# Patient Record
Sex: FEMALE | Race: WHITE
[De-identification: names, ages, dates, MRNs, and addresses within clinical notes are randomized per-mention and may not be internally consistent; named-entity substitution may affect disease eponyms.]

---

## 2017-03-11 ENCOUNTER — HOSPITAL ENCOUNTER (EMERGENCY)
Dept: HOSPITAL 10 - FTE | Age: 27
Discharge: HOME | End: 2017-03-11
Payer: COMMERCIAL

## 2017-03-11 VITALS — WEIGHT: 124.56 LBS | HEIGHT: 55 IN | BODY MASS INDEX: 28.83 KG/M2

## 2017-03-11 DIAGNOSIS — R30.0: Primary | ICD-10-CM

## 2017-03-11 DIAGNOSIS — F17.210: ICD-10-CM

## 2017-03-11 PROCEDURE — 96372 THER/PROPH/DIAG INJ SC/IM: CPT

## 2017-03-11 PROCEDURE — 81003 URINALYSIS AUTO W/O SCOPE: CPT

## 2017-03-11 PROCEDURE — 87086 URINE CULTURE/COLONY COUNT: CPT

## 2017-03-11 PROCEDURE — 87591 N.GONORRHOEAE DNA AMP PROB: CPT

## 2017-03-11 NOTE — ERD
ER Documentation


Chief Complaint


Date/Time


DATE: 3/11/17 


TIME: 16:17


Chief Complaint


VAGINAL DISCHARGE FOR THE PAST FEW DAYS. NO FEVERS. MILD PAIN





HPI


This is a 26-year-old female presenting to the emergency department for dysuria 

and white vaginal discharge x 3 days.  Patient states for the past 3 days she 

has had itching to vagina with large amount of white cottage cheese like 

discharge.  Patient also reports foul odor.  Patient is concerned because she 

has a new sexual partner and had unprotected sexual intercourse.  No pelvic 

pain or vaginal bleeding.  Patient was recently on MetroGel for treatment of 

BV.  No rashes or lesions.  No fevers or chills.  No abdominal or back pain.





ROS


All systems reviewed and are negative except as per history of present illness.





Medications


Home Meds


Active Scripts


Fluconazole* (Diflucan*) 150 Mg Tablet, 150 MG PO ONCE, #1 TAB


   Prov:RADHA BAR NP         3/11/17


Metronidazole* (Flagyl*) 500 Mg Tablet, 500 MG PO TID for 5 Days, TAB


   Prov:MARIBEL JASSO S.         1/7/15


Ciprofloxacin Hcl* (Ciprofloxacin Hcl*) 500 Mg Tablet, 500 MG PO BID for 5 Days

, TAB


   Prov:JEFFERY JASSOP S.         1/7/15


Ondansetron Hcl* (Zofran*) 4 Mg Tab, 4 MG PO Q6H Y for NAUSEA AND OR VOMITING 

for 30 Days, TAB


   Prov:JEFFERY JASSOP S.         1/7/15


Hyoscyamine Sulfate* (Levsin*) 0.125 Mg Tab, 0.125 MG PO AC MEALS for 30 Days, 

1 Refill


   Prov:MARIBEL JASSO S.         1/7/15





Allergies


Allergies:  


Coded Allergies:  


     No Known Drug Allergies (Verified  Allergy, Mild, 1/5/15)





PMhx/Soc


History of Surgery:  No


Anesthesia Reaction:  No


Hx Neurological Disorder:  No


Hx Respiratory Disorders:  Yes (bronchitis)


Hx Cardiac Disorders:  No


Hx Psychiatric Problems:  Yes (anxiety)


Hx Miscellaneous Medical Probl:  No


Hx Alcohol Use:  Yes (2x a week)


Hx Substance Use:  No


Hx Tobacco Use:  Yes (1 pack a day)


Smoking Status:  Current every day smoker





Physical Exam


Vitals





Vital Signs








  Date Time  Temp Pulse Resp B/P Pulse Ox O2 Delivery O2 Flow Rate FiO2


 


3/11/17 12:20 98.1 78 20 125/77 98   








Physical Exam


Const:               No acute distress. 


Head:   Atraumatic 


Eyes:    Normal Conjunctiva


ENT:    Normal External Ears, Nose and Mouth.


Neck:               Full range of motion..~ No meningismus.


Resp:    Clear to auscultation bilaterally


Cardio:    Regular rate and rhythm, no murmurs


Abd:    Soft, non tender, non distended. Normal bowel sounds


Skin:    No petechiae or rashes


Back:    No midline or flank tenderness


Ext:    No cyanosis, or edema


Neur:    Awake and alert


Psych:    Normal Mood and Affect


:                  Small amount of white, curd-like discharge from vaginal 

vault. no rashes or lesions.


Results 24 hrs





 Laboratory Tests








Test


  3/11/17


12:37


 


Bedside Urine Blood Negative 


 


Bedside Urine Glucose (UA) Negative 


 


Bedside Urine Ketones (LAB) Trace 


 


Bedside Urine Leukocyte


Esterase (L Trace 


 


 


Bedside Urine Nitrite (LAB) Negative 


 


Bedside Urine Protein (LAB) Negative 


 


Bedside Urine pH (LAB) 5.0 








 Current Medications








 Medications


  (Trade)  Dose


 Ordered  Sig/Raji


 Route


 PRN Reason  Start Time


 Stop Time Status Last Admin


Dose Admin


 


 Ceftriaxone Sodium


  (Rocephin)  250 mg  ONCE  ONCE


 IM


   3/11/17 14:00


 3/11/17 14:01 DC 3/11/17 14:16


 


 


 Azithromycin


  (Zithromax)  1,000 mg  ONCE  ONCE


 PO


   3/11/17 14:00


 3/11/17 14:01 DC 3/11/17 14:16


 











Procedures/MDM


ED COURSE:


The patient was stable throughout ED course. I kept the patient and/or family 

informed of laboratory and diagnostic imaging results throughout the ED course.

  





Laboratory


Urine dip0 trace leukocytes


gonorrhea/chlamydia- pending





MDM: This is a 26-year-old female presenting to the emergency department for 

dysuria, pruritus and white vaginal discharge x 3 days.  Patient has recent new 

sexual partner and is concerned about STDs.  Urine shows trace leukocyte 

esterase and trace ketones.  gonorrhea and chlamydia urine test awaiting 

results.  Patient counselled on preventing STDs and prophylactic treatment of 

gonorrhea/chlamydia.  Patient agrees to plan of care and received Rocephin 

250mg IM and azithromycin 1g PO while in the ED as prophylactic care of 

gonorrhea and chlamydia.  Patient tolerated medications well.  Vital signs are 

stable.  No fevers or chills.  No vaginal bleeding or pelvic cramping.  No 

abdominal pain or back pain.  No vomiting or diarrhea.  





Differential diagnosis includes but not limited to UTI, gonorrhea, chlamydia, 

bacterial vaginosis, vaginitis, candidal infection or pelvic inflammatory 

disease.  





Patient is appropriate for outpatient management will be given prescription for 

Diflucan.  Instructed patient to follow-up with primary care provider in the 

next 24-48 hours for reassessment and additional management.  Instructed 

patient that we will be receiving results of gonorrhea and chlamydia inthe next 

few days.  Return to ED for any high fever, chest pain, difficulty breathing, 

shortness breath, wheezing, vomiting, diarrhea, abdominal pain or any new or 

worsening symptoms. Patient verbalizes understanding. All questions answered at 

discharge.





Departure


Diagnosis:  


 Primary Impression:  


 Dysuria


Condition:  Stable


Patient Instructions:  Dysuria


Referrals:  


[facility]


YOU HAVE RECEIVED A MEDICAL SCREENING EXAM AND THE RESULTS INDICATE THAT YOU DO 

NOT HAVE A CONDITION THAT REQUIRES URGENT TREATMENT IN THE EMERGENCY DEPARTMENT.





FURTHER EVALUATION AND TREATMENT OF YOUR CONDITION CAN WAIT UNTIL YOU ARE SEEN 

IN YOUR DOCTORS OFFICE WITHIN THE NEXT 1-2 DAYS. IT IS YOUR RESPONSIBILITY TO 

MAKE AN APPOINTMENT FOR FOLOW-UP CARE.





IF YOU HAVE A PRIMARY DOCTOR


--you should call your primary doctor and schedule an appointment





IF YOU DO NOT HAVE A PRIMARY DOCTOR YOU CAN CALL OUR PHYSICIAN REFERRAL HOTLINE 

AT


 [phone] 





IF YOU CAN NOT AFFORD TO SEE A PHYSICIAN YOU CAN CHOSE FROM THE FOLLOWING 

[facility] [phone] [address] [facility]. [facility] [phone] [address] [facility]. [facility] [phone] [address] [facility]. [facility] [phone] [address] [facility]. [facility] [phone] [address] [facility]. [facility]. [phone] [address] [facility]. [facility]


YOU HAVE RECEIVED A MEDICAL SCREENING EXAM AND THE RESULTS INDICATE THAT YOU DO 

NOT HAVE A CONDITION THAT REQUIRES URGENT TREATMENT IN THE EMERGENCY DEPARTMENT.





FURTHER EVALUATION AND TREATMENT OF YOUR CONDITION CAN WAIT UNTIL YOU ARE SEEN 

IN YOUR DOCTORS OFFICE WITHIN THE NEXT 1-2 DAYS. IT IS YOUR RESPONSIBILITY TO 

MAKE AN APPOINTMENT FOR FOLOW-UP CARE.





IF YOU HAVE A PRIMARY DOCTOR


--you should call your primary doctor and schedule and appointment





IF YOU DO NOT HAVE A PRIMARY DOCTOR YOU CAN CALL OUR PHYSICIAN REFERRAL HOTLINE 

AT [phone].





IF YOU CAN NOT AFFORD TO SEE A PHYSICIAN YOU CAN CHOSE FROM THE FOLLOWING 

[facility] INSTITUTIONS:





[facility]


[address] [place] [address]





[facility]


[address] [place] [address]





[facility]


[address] [place] [address]





Additional Instructions:  


Call your primary care doctor TOMORROW for an appointment during the next 2-3 

days.See the doctor sooner or return here if your condition worsens before your 

appointment time.





Return to ED for any high fever, chest pain, difficulty breathing, shortness 

breath, wheezing, vomiting, diarrhea, abdominal pain or any new or worsening 

symptoms.











RADHA BAR NP Mar 11, 2017 16:31

## 2017-03-14 LAB — LABORATORY COMMENT REPORT: (no result)

## 2017-03-21 ENCOUNTER — HOSPITAL ENCOUNTER (EMERGENCY)
Dept: HOSPITAL 10 - FTE | Age: 27
Discharge: LEFT BEFORE DISCHARGE | End: 2017-03-21
Payer: SELF-PAY

## 2017-03-21 VITALS
BODY MASS INDEX: 18.82 KG/M2 | HEIGHT: 64 IN | WEIGHT: 110.23 LBS | HEIGHT: 64 IN | BODY MASS INDEX: 18.82 KG/M2 | WEIGHT: 110.23 LBS

## 2017-03-21 DIAGNOSIS — Z53.21: Primary | ICD-10-CM

## 2017-10-16 ENCOUNTER — HOSPITAL ENCOUNTER (EMERGENCY)
Dept: HOSPITAL 10 - FTE | Age: 27
LOS: 1 days | Discharge: LEFT BEFORE DISCHARGE | End: 2017-10-17
Payer: SELF-PAY

## 2017-10-16 VITALS
HEIGHT: 63 IN | WEIGHT: 126.77 LBS | BODY MASS INDEX: 22.46 KG/M2 | WEIGHT: 126.77 LBS | BODY MASS INDEX: 22.46 KG/M2 | HEIGHT: 63 IN

## 2017-10-16 DIAGNOSIS — Z53.21: Primary | ICD-10-CM

## 2018-01-28 ENCOUNTER — HOSPITAL ENCOUNTER (EMERGENCY)
Dept: HOSPITAL 54 - ER | Age: 28
LOS: 1 days | Discharge: LEFT BEFORE DISCHARGE | End: 2018-01-29
Payer: MEDICAID

## 2018-01-28 VITALS — HEIGHT: 64 IN | BODY MASS INDEX: 20.49 KG/M2 | WEIGHT: 120 LBS

## 2018-01-28 DIAGNOSIS — Z71.6: ICD-10-CM

## 2018-01-28 DIAGNOSIS — F41.9: ICD-10-CM

## 2018-01-28 DIAGNOSIS — R05: Primary | ICD-10-CM

## 2018-01-28 DIAGNOSIS — N89.8: ICD-10-CM

## 2018-01-28 DIAGNOSIS — H92.02: ICD-10-CM

## 2018-01-28 PROCEDURE — A4606 OXYGEN PROBE USED W OXIMETER: HCPCS

## 2018-01-28 PROCEDURE — Z7610: HCPCS

## 2018-01-28 NOTE — NUR
BIBSELF C/O "COUGH X2 WEEKS; EAR PAIN" RR EVEN AND UNLABORED. NO SOB NOTED. NAD 
NOTED. NO NVD AT THIS TIME. PT GOWNED AND PLACED ON MONITOR WAITING FOR MD CHONG.

## 2018-01-29 VITALS — SYSTOLIC BLOOD PRESSURE: 116 MMHG | DIASTOLIC BLOOD PRESSURE: 64 MMHG

## 2018-01-29 RX ADMIN — AZITHROMYCIN DIHYDRATE ONE MG: 250 TABLET, FILM COATED ORAL at 00:14

## 2018-01-29 RX ADMIN — PSEUDOEPHEDRINE HCL ONE MG: 30 TABLET, FILM COATED ORAL at 00:13

## 2018-01-29 NOTE — NUR
Patient does not wish to proceed with medical care recommended by  
Patient given information related to possible complications, up to and 
including death, which could occur as a result of leaving the hospital at this 
time. Patient verbalizes understanding of risks involved due to leaving against 
medical advice. Patient has signed AMA form.

## 2019-02-23 ENCOUNTER — HOSPITAL ENCOUNTER (EMERGENCY)
Dept: HOSPITAL 54 - ER | Age: 29
Discharge: HOME | End: 2019-02-23
Payer: MEDICAID

## 2019-02-23 VITALS — BODY MASS INDEX: 20.49 KG/M2 | WEIGHT: 120 LBS | HEIGHT: 64 IN

## 2019-02-23 VITALS — SYSTOLIC BLOOD PRESSURE: 125 MMHG | DIASTOLIC BLOOD PRESSURE: 92 MMHG

## 2019-02-23 DIAGNOSIS — F17.200: ICD-10-CM

## 2019-02-23 DIAGNOSIS — F41.9: Primary | ICD-10-CM

## 2019-02-23 DIAGNOSIS — R00.0: ICD-10-CM

## 2019-02-23 PROCEDURE — 93005 ELECTROCARDIOGRAM TRACING: CPT

## 2019-02-23 PROCEDURE — 99284 EMERGENCY DEPT VISIT MOD MDM: CPT

## 2019-02-23 NOTE — NUR
IV removed. Catheter intact and site benign. Pressure and 4x4 applied to site. 
No bleeding noted.Patient discharged to home in stable condition. Written and 
verbal after care instructions given. Patient verbalizes understanding of 
instruction. PT AMBULATORY WITH STEADY GAIT ACCOMPANIED BY MOTHER.

## 2019-02-23 NOTE — NUR
PT MONICA FROM HOME C/O ANXIETY X 1 HOUR. HX OF ANXIETY. PT MOTHER AT CHAIRSIDE. 
WILL CONTINUE TO MONITOR.

## 2019-11-25 ENCOUNTER — HOSPITAL ENCOUNTER (EMERGENCY)
Dept: HOSPITAL 54 - ER | Age: 29
Discharge: HOME | End: 2019-11-25
Payer: MEDICAID

## 2019-11-25 VITALS — BODY MASS INDEX: 22.2 KG/M2 | HEIGHT: 64 IN | WEIGHT: 130 LBS

## 2019-11-25 VITALS — SYSTOLIC BLOOD PRESSURE: 111 MMHG | DIASTOLIC BLOOD PRESSURE: 78 MMHG

## 2019-11-25 DIAGNOSIS — N61.1: Primary | ICD-10-CM

## 2019-11-25 DIAGNOSIS — F41.9: ICD-10-CM

## 2019-11-25 DIAGNOSIS — F17.200: ICD-10-CM

## 2019-11-25 DIAGNOSIS — F10.10: ICD-10-CM
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
## 2019-11-25 RX ADMIN — IBUPROFEN ONE MG: 600 TABLET, FILM COATED ORAL at 02:20

## 2019-11-25 RX ADMIN — CEPHALEXIN ONE MG: 500 CAPSULE ORAL at 02:20

## 2020-02-06 ENCOUNTER — HOSPITAL ENCOUNTER (EMERGENCY)
Dept: HOSPITAL 54 - ER | Age: 30
Discharge: HOME | End: 2020-02-06
Payer: MEDICAID

## 2020-02-06 VITALS — SYSTOLIC BLOOD PRESSURE: 128 MMHG | DIASTOLIC BLOOD PRESSURE: 78 MMHG

## 2020-02-06 VITALS — WEIGHT: 132 LBS | BODY MASS INDEX: 22.53 KG/M2 | HEIGHT: 64 IN
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
## 2020-02-06 DIAGNOSIS — F17.200: ICD-10-CM

## 2020-02-06 DIAGNOSIS — R00.0: ICD-10-CM

## 2020-02-06 DIAGNOSIS — F10.10: ICD-10-CM

## 2020-02-06 DIAGNOSIS — R00.2: ICD-10-CM

## 2020-02-06 DIAGNOSIS — F41.9: Primary | ICD-10-CM

## 2020-02-06 DIAGNOSIS — F12.10: ICD-10-CM

## 2020-02-06 NOTE — NUR
Patient discharged to home in stable condition. Written and verbal after care 
instructions given. Patient verbalizes understanding of instruction. Pt 
ambulated with steady gait. vss.

## 2020-02-06 NOTE — NUR
PT AAOX4. AMBULATORY WITH STEADY GAIT. BIBRA C/O ANXIETY S/P SMOKING MARIJUANA. 
PT STATED SHE SMOKERD MARIJUANA YESTERDAY. PLACED ON MONITOR AND PULSE OX. VSS. 
NO ACUTE DISTRESS NOTED. PT NTOED SHE TOOK Kyrgyz HERBAL WHICH HELPED HER 
CALM DOWN.

## 2020-06-24 ENCOUNTER — HOSPITAL ENCOUNTER (EMERGENCY)
Dept: HOSPITAL 54 - ER | Age: 30
Discharge: HOME | End: 2020-06-24
Payer: MEDICAID

## 2020-06-24 VITALS — BODY MASS INDEX: 24.75 KG/M2 | HEIGHT: 64 IN | WEIGHT: 145 LBS

## 2020-06-24 VITALS — DIASTOLIC BLOOD PRESSURE: 61 MMHG | SYSTOLIC BLOOD PRESSURE: 101 MMHG

## 2020-06-24 DIAGNOSIS — F14.90: Primary | ICD-10-CM

## 2020-06-24 DIAGNOSIS — F17.200: ICD-10-CM

## 2020-06-24 DIAGNOSIS — F41.9: ICD-10-CM

## 2020-06-24 LAB
BASOPHILS # BLD AUTO: 0.1 /CMM (ref 0–0.2)
BASOPHILS NFR BLD AUTO: 0.5 % (ref 0–2)
BUN SERPL-MCNC: 11 MG/DL (ref 7–18)
CALCIUM SERPL-MCNC: 8.9 MG/DL (ref 8.5–10.1)
CHLORIDE SERPL-SCNC: 99 MMOL/L (ref 98–107)
CO2 SERPL-SCNC: 26 MMOL/L (ref 21–32)
CREAT SERPL-MCNC: 0.7 MG/DL (ref 0.6–1.3)
EOSINOPHIL NFR BLD AUTO: 0.3 % (ref 0–6)
GLUCOSE SERPL-MCNC: 99 MG/DL (ref 74–106)
HCT VFR BLD AUTO: 41 % (ref 33–45)
HGB BLD-MCNC: 13.9 G/DL (ref 11.5–14.8)
LYMPHOCYTES NFR BLD AUTO: 2.4 /CMM (ref 0.8–4.8)
LYMPHOCYTES NFR BLD AUTO: 20 % (ref 20–44)
MCHC RBC AUTO-ENTMCNC: 34 G/DL (ref 31–36)
MCV RBC AUTO: 90 FL (ref 82–100)
MONOCYTES NFR BLD AUTO: 0.8 /CMM (ref 0.1–1.3)
MONOCYTES NFR BLD AUTO: 6.5 % (ref 2–12)
NEUTROPHILS # BLD AUTO: 8.7 /CMM (ref 1.8–8.9)
NEUTROPHILS NFR BLD AUTO: 72.7 % (ref 43–81)
PLATELET # BLD AUTO: 297 /CMM (ref 150–450)
POTASSIUM SERPL-SCNC: 3.2 MMOL/L (ref 3.5–5.1)
RBC # BLD AUTO: 4.59 MIL/UL (ref 4–5.2)
SODIUM SERPL-SCNC: 135 MMOL/L (ref 136–145)
WBC NRBC COR # BLD AUTO: 12 K/UL (ref 4.3–11)

## 2020-06-24 PROCEDURE — 85025 COMPLETE CBC W/AUTO DIFF WBC: CPT

## 2020-06-24 PROCEDURE — 84703 CHORIONIC GONADOTROPIN ASSAY: CPT

## 2020-06-24 PROCEDURE — 99285 EMERGENCY DEPT VISIT HI MDM: CPT

## 2020-06-24 PROCEDURE — 71045 X-RAY EXAM CHEST 1 VIEW: CPT

## 2020-06-24 PROCEDURE — 96374 THER/PROPH/DIAG INJ IV PUSH: CPT

## 2020-06-24 PROCEDURE — 84484 ASSAY OF TROPONIN QUANT: CPT

## 2020-06-24 PROCEDURE — 80048 BASIC METABOLIC PNL TOTAL CA: CPT

## 2020-06-24 PROCEDURE — 93005 ELECTROCARDIOGRAM TRACING: CPT

## 2020-06-24 PROCEDURE — 36415 COLL VENOUS BLD VENIPUNCTURE: CPT

## 2020-06-24 NOTE — NUR
[id number] FRM HOME C/O HEART PALPITATION S/P COCAINE USE. +ETOH SMELL. ON ROOM 
AIR, BREATHING EVENLY AND UNLABORED. CONNECTED TO THE MONITOR AND PULSE OX. 
KEPT COMFORTABLE, WILL CONTINUE TO MONITOR ACCORDINGLY.

## 2020-08-04 ENCOUNTER — HOSPITAL ENCOUNTER (EMERGENCY)
Dept: HOSPITAL 54 - ER | Age: 30
Discharge: HOME | End: 2020-08-04
Payer: MEDICAID

## 2020-08-04 VITALS — SYSTOLIC BLOOD PRESSURE: 115 MMHG | DIASTOLIC BLOOD PRESSURE: 65 MMHG

## 2020-08-04 VITALS — BODY MASS INDEX: 27.88 KG/M2 | WEIGHT: 142 LBS | HEIGHT: 60 IN

## 2020-08-04 DIAGNOSIS — F14.10: Primary | ICD-10-CM

## 2020-08-04 DIAGNOSIS — F41.9: ICD-10-CM
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
## 2020-08-04 DIAGNOSIS — F17.200: ICD-10-CM

## 2020-08-04 DIAGNOSIS — F10.10: ICD-10-CM

## 2020-08-04 NOTE — NUR
pt bibra from home. c/o anxiety, chest palpitation x today. pt admits to using 
cocain last night. pt seen in ED multiple times for same reason. tachycardic 
pta. awaiting md hollingsworth.

## 2020-09-03 ENCOUNTER — HOSPITAL ENCOUNTER (EMERGENCY)
Dept: HOSPITAL 54 - ER | Age: 30
Discharge: HOME | End: 2020-09-03
Payer: MEDICAID

## 2020-09-03 VITALS — SYSTOLIC BLOOD PRESSURE: 111 MMHG | DIASTOLIC BLOOD PRESSURE: 72 MMHG

## 2020-09-03 VITALS — WEIGHT: 140 LBS | HEIGHT: 63 IN | BODY MASS INDEX: 24.8 KG/M2

## 2020-09-03 DIAGNOSIS — R11.0: ICD-10-CM

## 2020-09-03 DIAGNOSIS — I10: ICD-10-CM

## 2020-09-03 DIAGNOSIS — F17.200: ICD-10-CM

## 2020-09-03 DIAGNOSIS — F15.10: ICD-10-CM

## 2020-09-03 DIAGNOSIS — F41.9: ICD-10-CM

## 2020-09-03 DIAGNOSIS — R10.30: Primary | ICD-10-CM

## 2020-09-03 LAB
ALBUMIN SERPL BCP-MCNC: 4 G/DL (ref 3.4–5)
ALP SERPL-CCNC: 66 U/L (ref 46–116)
ALT SERPL W P-5'-P-CCNC: 23 U/L (ref 12–78)
APPEARANCE UR: CLEAR
AST SERPL W P-5'-P-CCNC: 24 U/L (ref 15–37)
BASOPHILS # BLD AUTO: 0.1 /CMM (ref 0–0.2)
BASOPHILS NFR BLD AUTO: 0.6 % (ref 0–2)
BILIRUB DIRECT SERPL-MCNC: 0.2 MG/DL (ref 0–0.2)
BILIRUB SERPL-MCNC: 0.7 MG/DL (ref 0.2–1)
BILIRUB UR QL STRIP: NEGATIVE
BUN SERPL-MCNC: 18 MG/DL (ref 7–18)
CALCIUM SERPL-MCNC: 9.1 MG/DL (ref 8.5–10.1)
CHLORIDE SERPL-SCNC: 101 MMOL/L (ref 98–107)
CO2 SERPL-SCNC: 27 MMOL/L (ref 21–32)
COLOR UR: YELLOW
CREAT SERPL-MCNC: 0.7 MG/DL (ref 0.6–1.3)
EOSINOPHIL NFR BLD AUTO: 0.5 % (ref 0–6)
GLUCOSE SERPL-MCNC: 84 MG/DL (ref 74–106)
GLUCOSE UR STRIP-MCNC: NEGATIVE MG/DL
HCT VFR BLD AUTO: 41 % (ref 33–45)
HGB BLD-MCNC: 13.7 G/DL (ref 11.5–14.8)
HGB UR QL STRIP: (no result) ERY/UL
KETONES UR STRIP-MCNC: NEGATIVE MG/DL
LEUKOCYTE ESTERASE UR QL STRIP: NEGATIVE
LIPASE SERPL-CCNC: 119 U/L (ref 73–393)
LYMPHOCYTES NFR BLD AUTO: 1.6 /CMM (ref 0.8–4.8)
LYMPHOCYTES NFR BLD AUTO: 16.1 % (ref 20–44)
MCHC RBC AUTO-ENTMCNC: 33 G/DL (ref 31–36)
MCV RBC AUTO: 90 FL (ref 82–100)
MONOCYTES NFR BLD AUTO: 0.6 /CMM (ref 0.1–1.3)
MONOCYTES NFR BLD AUTO: 5.7 % (ref 2–12)
NEUTROPHILS # BLD AUTO: 7.7 /CMM (ref 1.8–8.9)
NEUTROPHILS NFR BLD AUTO: 77.1 % (ref 43–81)
NITRITE UR QL STRIP: NEGATIVE
PH UR STRIP: 6 [PH] (ref 5–8)
PLATELET # BLD AUTO: 221 /CMM (ref 150–450)
POTASSIUM SERPL-SCNC: 3.8 MMOL/L (ref 3.5–5.1)
PROT SERPL-MCNC: 7.6 G/DL (ref 6.4–8.2)
PROT UR QL STRIP: NEGATIVE MG/DL
RBC # BLD AUTO: 4.59 MIL/UL (ref 4–5.2)
SODIUM SERPL-SCNC: 138 MMOL/L (ref 136–145)
UROBILINOGEN UR STRIP-MCNC: 0.2 EU/DL
WBC NRBC COR # BLD AUTO: 10 K/UL (ref 4.3–11)

## 2020-09-03 PROCEDURE — 81001 URINALYSIS AUTO W/SCOPE: CPT

## 2020-09-03 PROCEDURE — 80048 BASIC METABOLIC PNL TOTAL CA: CPT

## 2020-09-03 PROCEDURE — 85025 COMPLETE CBC W/AUTO DIFF WBC: CPT

## 2020-09-03 PROCEDURE — 36415 COLL VENOUS BLD VENIPUNCTURE: CPT

## 2020-09-03 PROCEDURE — 80076 HEPATIC FUNCTION PANEL: CPT

## 2020-09-03 PROCEDURE — 99284 EMERGENCY DEPT VISIT MOD MDM: CPT

## 2020-09-03 PROCEDURE — 84703 CHORIONIC GONADOTROPIN ASSAY: CPT

## 2020-09-03 PROCEDURE — 83690 ASSAY OF LIPASE: CPT

## 2020-09-03 PROCEDURE — 76856 US EXAM PELVIC COMPLETE: CPT

## 2020-09-03 NOTE — NUR
PT AAOX4. BIBSELF C/O LOWER ABDOMINAL PAIN X1 DAY 
+NAUSEA,-VOMITTING,-DYSURIA,-VB. NO ACUTE DISTRESS NOTED. PT PLACED ON MONITOR 
AND PULSE OX. VSS. NO ACUTE DISTRESS NOTED.

## 2021-03-28 ENCOUNTER — HOSPITAL ENCOUNTER (EMERGENCY)
Dept: HOSPITAL 54 - ER | Age: 31
Discharge: HOME | End: 2021-03-28
Payer: MEDICAID

## 2021-03-28 VITALS — SYSTOLIC BLOOD PRESSURE: 123 MMHG | DIASTOLIC BLOOD PRESSURE: 66 MMHG

## 2021-03-28 VITALS — BODY MASS INDEX: 22.08 KG/M2 | HEIGHT: 62 IN | WEIGHT: 120 LBS

## 2021-03-28 DIAGNOSIS — F41.9: ICD-10-CM

## 2021-03-28 DIAGNOSIS — N89.8: Primary | ICD-10-CM

## 2021-03-28 PROCEDURE — 84703 CHORIONIC GONADOTROPIN ASSAY: CPT

## 2021-03-28 PROCEDURE — 96372 THER/PROPH/DIAG INJ SC/IM: CPT

## 2021-03-28 PROCEDURE — 99283 EMERGENCY DEPT VISIT LOW MDM: CPT

## 2021-03-28 PROCEDURE — 87491 CHLMYD TRACH DNA AMP PROBE: CPT

## 2021-03-28 PROCEDURE — 87591 N.GONORRHOEAE DNA AMP PROB: CPT

## 2021-03-28 NOTE — NUR
PATIENT CAME TO ER BED 16 C/O CONCERN FOR GENITALIA ISSUES. PATIENT STATES THAT 
SHE HAD NOT HAD SEX FOR 5 MONTHS AND "DECIDED TO HOOK UP WITH SOMEONE". PATIENT 
STATES THAT SHE HAS ITCHINESS IN THE CROTCH REASON. PATIENT IS AAOX4. NO SOB 
.BREATHING EVENLY AND UNLABORED ON ROOM AIR.

## 2021-05-02 ENCOUNTER — HOSPITAL ENCOUNTER (EMERGENCY)
Dept: HOSPITAL 54 - ER | Age: 31
Discharge: HOME | End: 2021-05-02
Payer: MEDICAID

## 2021-05-02 VITALS — WEIGHT: 140 LBS | HEIGHT: 63 IN | BODY MASS INDEX: 24.8 KG/M2

## 2021-05-02 VITALS — SYSTOLIC BLOOD PRESSURE: 100 MMHG | DIASTOLIC BLOOD PRESSURE: 74 MMHG

## 2021-05-02 DIAGNOSIS — F17.200: ICD-10-CM

## 2021-05-02 DIAGNOSIS — F41.9: Primary | ICD-10-CM

## 2021-05-02 DIAGNOSIS — R42: ICD-10-CM

## 2021-05-02 LAB
ALBUMIN SERPL BCP-MCNC: 4 G/DL (ref 3.4–5)
ALP SERPL-CCNC: 80 U/L (ref 46–116)
ALT SERPL W P-5'-P-CCNC: 26 U/L (ref 12–78)
AST SERPL W P-5'-P-CCNC: 24 U/L (ref 15–37)
BASOPHILS # BLD AUTO: 0 /CMM (ref 0–0.2)
BASOPHILS NFR BLD AUTO: 0.4 % (ref 0–2)
BILIRUB DIRECT SERPL-MCNC: 0.1 MG/DL (ref 0–0.2)
BILIRUB SERPL-MCNC: 0.4 MG/DL (ref 0.2–1)
BILIRUB UR QL STRIP: NEGATIVE
BUN SERPL-MCNC: 16 MG/DL (ref 7–18)
CALCIUM SERPL-MCNC: 9.2 MG/DL (ref 8.5–10.1)
CHLORIDE SERPL-SCNC: 103 MMOL/L (ref 98–107)
CO2 SERPL-SCNC: 27 MMOL/L (ref 21–32)
COLOR UR: YELLOW
CREAT SERPL-MCNC: 0.7 MG/DL (ref 0.6–1.3)
EOSINOPHIL NFR BLD AUTO: 1.1 % (ref 0–6)
GLUCOSE SERPL-MCNC: 89 MG/DL (ref 74–106)
GLUCOSE UR STRIP-MCNC: NEGATIVE MG/DL
HCT VFR BLD AUTO: 41 % (ref 33–45)
HGB BLD-MCNC: 13.8 G/DL (ref 11.5–14.8)
LEUKOCYTE ESTERASE UR QL STRIP: (no result)
LIPASE SERPL-CCNC: 499 U/L (ref 73–393)
LYMPHOCYTES NFR BLD AUTO: 2.4 /CMM (ref 0.8–4.8)
LYMPHOCYTES NFR BLD AUTO: 22.7 % (ref 20–44)
MCHC RBC AUTO-ENTMCNC: 34 G/DL (ref 31–36)
MCV RBC AUTO: 89 FL (ref 82–100)
MONOCYTES NFR BLD AUTO: 0.7 /CMM (ref 0.1–1.3)
MONOCYTES NFR BLD AUTO: 6.9 % (ref 2–12)
NEUTROPHILS # BLD AUTO: 7.2 /CMM (ref 1.8–8.9)
NEUTROPHILS NFR BLD AUTO: 68.9 % (ref 43–81)
NITRITE UR QL STRIP: NEGATIVE
PH UR STRIP: 6 [PH] (ref 5–8)
PLATELET # BLD AUTO: 232 /CMM (ref 150–450)
POTASSIUM SERPL-SCNC: 3.7 MMOL/L (ref 3.5–5.1)
PROT SERPL-MCNC: 7.8 G/DL (ref 6.4–8.2)
PROT UR QL STRIP: NEGATIVE MG/DL
RBC # BLD AUTO: 4.6 MIL/UL (ref 4–5.2)
RBC #/AREA URNS HPF: (no result) /HPF (ref 0–2)
SODIUM SERPL-SCNC: 137 MMOL/L (ref 136–145)
UROBILINOGEN UR STRIP-MCNC: 0.2 EU/DL
WBC #/AREA URNS HPF: (no result) /HPF
WBC #/AREA URNS HPF: (no result) /HPF (ref 0–3)
WBC NRBC COR # BLD AUTO: 10.5 K/UL (ref 4.3–11)

## 2021-05-02 PROCEDURE — 80076 HEPATIC FUNCTION PANEL: CPT

## 2021-05-02 PROCEDURE — 85025 COMPLETE CBC W/AUTO DIFF WBC: CPT

## 2021-05-02 PROCEDURE — 99284 EMERGENCY DEPT VISIT MOD MDM: CPT

## 2021-05-02 PROCEDURE — 87086 URINE CULTURE/COLONY COUNT: CPT

## 2021-05-02 PROCEDURE — 96374 THER/PROPH/DIAG INJ IV PUSH: CPT

## 2021-05-02 PROCEDURE — 80048 BASIC METABOLIC PNL TOTAL CA: CPT

## 2021-05-02 PROCEDURE — 96361 HYDRATE IV INFUSION ADD-ON: CPT

## 2021-05-02 PROCEDURE — 93005 ELECTROCARDIOGRAM TRACING: CPT

## 2021-05-02 PROCEDURE — 84703 CHORIONIC GONADOTROPIN ASSAY: CPT

## 2021-05-02 PROCEDURE — 81001 URINALYSIS AUTO W/SCOPE: CPT

## 2021-05-02 PROCEDURE — 83690 ASSAY OF LIPASE: CPT

## 2021-05-02 PROCEDURE — 36415 COLL VENOUS BLD VENIPUNCTURE: CPT

## 2021-05-02 NOTE — NUR
PT BIBRA 881 FROM WORK C/O DIZZINESS X2 DAYS MUCH WORSE TODAY. PT IS AAOX4, NOT 
IN RESPIRATORY DISTRESS, V/S STABLE, KEPT RESTED AND COMFORTABLE. WILL CONTINUE 
TO MONITOR.

## 2021-05-02 NOTE — NUR
Patient:   JENNIFER TROTTER            MRN: [id number]            FIN: [id number]               Age:   55 years     Sex:  FEMALE     :  62   Associated Diagnoses:   None   Author:   BRITTON HERNANDEZ      **Required when the History and Physical has been performed prior to Registration**  (within a 30 day period, if it's over 30 days then a new and complete History and Physicial needs to be completed)  **An update to the history and phsycial must be completed prior to the start of the surgery or procedure requiring anesthesia services.**    Prior to the procedure the History and Physicial from date _17  has been reviewed and I have examined the patient.  Based upon my physicial assessment and interview of the patient:    Check and/or complete:    [X] No significant changes have occurred in the patient's condition since the History and Physical was completed.      OR    [_] Changes to History and Physical (see below):     _                                          Electronically Signed On 2017 10:37  __________________________________________________   BRITTON HERNANDEZ     REPORT GIVEN TO TOSHA RESENDEZ FOR CARMELITA.

## 2021-05-04 ENCOUNTER — HOSPITAL ENCOUNTER (EMERGENCY)
Dept: HOSPITAL 54 - ER | Age: 31
Discharge: HOME | End: 2021-05-04
Payer: MEDICAID

## 2021-05-04 VITALS — BODY MASS INDEX: 24.8 KG/M2 | HEIGHT: 63 IN | WEIGHT: 140 LBS

## 2021-05-04 VITALS — SYSTOLIC BLOOD PRESSURE: 110 MMHG | DIASTOLIC BLOOD PRESSURE: 85 MMHG

## 2021-05-04 DIAGNOSIS — R42: ICD-10-CM

## 2021-05-04 DIAGNOSIS — R51.9: Primary | ICD-10-CM

## 2021-05-04 DIAGNOSIS — F17.200: ICD-10-CM

## 2021-05-04 DIAGNOSIS — Z79.899: ICD-10-CM

## 2021-05-04 DIAGNOSIS — F41.9: ICD-10-CM

## 2021-05-04 PROCEDURE — 99283 EMERGENCY DEPT VISIT LOW MDM: CPT

## 2021-05-04 PROCEDURE — 96372 THER/PROPH/DIAG INJ SC/IM: CPT

## 2021-05-25 ENCOUNTER — HOSPITAL ENCOUNTER (EMERGENCY)
Dept: HOSPITAL 54 - ER | Age: 31
Discharge: HOME | End: 2021-05-25
Payer: MEDICAID

## 2021-05-25 VITALS — WEIGHT: 139 LBS | BODY MASS INDEX: 23.73 KG/M2 | HEIGHT: 64 IN

## 2021-05-25 VITALS — DIASTOLIC BLOOD PRESSURE: 74 MMHG | SYSTOLIC BLOOD PRESSURE: 112 MMHG

## 2021-05-25 DIAGNOSIS — R10.2: Primary | ICD-10-CM

## 2021-05-25 DIAGNOSIS — F41.9: ICD-10-CM

## 2021-05-25 DIAGNOSIS — F17.200: ICD-10-CM

## 2021-05-25 DIAGNOSIS — Z79.899: ICD-10-CM

## 2021-05-25 LAB
BILIRUB UR QL STRIP: NEGATIVE
COLOR UR: YELLOW
GLUCOSE UR STRIP-MCNC: NEGATIVE MG/DL
LEUKOCYTE ESTERASE UR QL STRIP: (no result)
NITRITE UR QL STRIP: NEGATIVE
PH UR STRIP: 7 [PH] (ref 5–8)
PROT UR QL STRIP: NEGATIVE MG/DL
RBC #/AREA URNS HPF: (no result) /HPF (ref 0–2)
UROBILINOGEN UR STRIP-MCNC: 0.2 EU/DL
WBC #/AREA URNS HPF: (no result) /HPF (ref 0–3)

## 2021-05-25 PROCEDURE — 96372 THER/PROPH/DIAG INJ SC/IM: CPT

## 2021-05-25 PROCEDURE — 87210 SMEAR WET MOUNT SALINE/INK: CPT

## 2021-05-25 PROCEDURE — 84703 CHORIONIC GONADOTROPIN ASSAY: CPT

## 2021-05-25 PROCEDURE — 81001 URINALYSIS AUTO W/SCOPE: CPT

## 2021-05-25 PROCEDURE — 87591 N.GONORRHOEAE DNA AMP PROB: CPT

## 2021-05-25 PROCEDURE — 87491 CHLMYD TRACH DNA AMP PROBE: CPT

## 2021-05-25 PROCEDURE — 99284 EMERGENCY DEPT VISIT MOD MDM: CPT

## 2021-05-25 PROCEDURE — 76856 US EXAM PELVIC COMPLETE: CPT

## 2021-05-25 PROCEDURE — 87086 URINE CULTURE/COLONY COUNT: CPT

## 2021-05-25 NOTE — NUR
PT IS MEDICALLY STABLE FOR D/C. Patient discharged to home in stable condition. 
Rx and Written and verbal after care instructions given. Patient verbalizes 
understanding of instruction.

## 2021-05-25 NOTE — NUR
BIBSELF C/O LOWER ABD PAIN RADIATING TO BACK X 3 DAYS. LMP: 5/7/21, + WHITE 
VAGINAL DISCHARGE STATES REOCCURING YEAST INFECTION, ON FLUCONAZOLE X 1 MONTH. 
PT AMBULATORY TO BED 8 ER, ON MONITOR . VSS. WILL CONT TO MONITOR